# Patient Record
Sex: FEMALE | NOT HISPANIC OR LATINO | URBAN - METROPOLITAN AREA
[De-identification: names, ages, dates, MRNs, and addresses within clinical notes are randomized per-mention and may not be internally consistent; named-entity substitution may affect disease eponyms.]

---

## 2020-02-19 ENCOUNTER — APPOINTMENT (RX ONLY)
Dept: URBAN - METROPOLITAN AREA CLINIC 12 | Facility: CLINIC | Age: 70
Setting detail: DERMATOLOGY
End: 2020-02-19

## 2020-02-19 DIAGNOSIS — Z41.1 ENCOUNTER FOR COSMETIC SURGERY: ICD-10-CM

## 2020-02-19 PROBLEM — C43.71 MALIGNANT MELANOMA OF RIGHT LOWER LIMB, INCLUDING HIP: Status: ACTIVE | Noted: 2020-02-19

## 2020-02-19 PROCEDURE — ? PATIENT SPECIFIC COUNSELING

## 2020-02-19 PROCEDURE — ? PHOTOS OBTAINED

## 2020-02-19 PROCEDURE — ? COUNSELING - MELANOMA

## 2020-02-19 PROCEDURE — 99214 OFFICE O/P EST MOD 30 MIN: CPT

## 2020-02-19 ASSESSMENT — LOCATION DETAILED DESCRIPTION DERM: LOCATION DETAILED: RIGHT MEDIAL PLANTAR HEEL

## 2020-02-19 ASSESSMENT — LOCATION ZONE DERM: LOCATION ZONE: FEET

## 2020-02-19 ASSESSMENT — LOCATION SIMPLE DESCRIPTION DERM: LOCATION SIMPLE: RIGHT PLANTAR SURFACE

## 2020-02-19 NOTE — PROCEDURE: PHOTOS OBTAINED
Follow-Up For Additional Photos?: no
Detail Level: Simple
Follow-Up Interval: day
Photographed Locations: Photos were obtained of the surgical site(s).
Follow-Up Increment: 0

## 2020-02-19 NOTE — PROCEDURE: PATIENT SPECIFIC COUNSELING
Other (Free Text): Patient was referred by dr. 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 Patient is disabled. Mother and brother were present. Right inguinal lymph node involved with possible left lung nodule involvement. Dr. Dolan examined and stated that this is a very tough area to repair and if he does the repair it will most likely be in two separate surgeries. Dr. Dolan would like 5o talk to Art about her case because if he takes it down to the bone, dr. Dolan is not the surgeon to do the repair. Photos were taken today and patient will be contacted once dr. Dolan and dr. Cordova have spoken.
Detail Level: Simple

## 2020-02-19 NOTE — HPI: SKIN LESION (MALIGNANT MELANOMA)
How Severe Is Your Melanoma?: moderate
Is This A New Presentation, Or A Follow-Up?: Melanoma
Breslow Depth?: 1.7mm
Additional History: Stage 3 \\nInguinal lymph node shows metastatic melanoma \\nPossible involvement in left lung nodule